# Patient Record
Sex: FEMALE | Race: WHITE | URBAN - METROPOLITAN AREA
[De-identification: names, ages, dates, MRNs, and addresses within clinical notes are randomized per-mention and may not be internally consistent; named-entity substitution may affect disease eponyms.]

---

## 2022-03-08 ENCOUNTER — APPOINTMENT (RX ONLY)
Dept: URBAN - METROPOLITAN AREA CLINIC 377 | Facility: CLINIC | Age: 62
Setting detail: DERMATOLOGY
End: 2022-03-08

## 2022-03-08 DIAGNOSIS — D22 MELANOCYTIC NEVI: ICD-10-CM

## 2022-03-08 DIAGNOSIS — L82.1 OTHER SEBORRHEIC KERATOSIS: ICD-10-CM

## 2022-03-08 DIAGNOSIS — Z85.820 PERSONAL HISTORY OF MALIGNANT MELANOMA OF SKIN: ICD-10-CM

## 2022-03-08 DIAGNOSIS — L81.4 OTHER MELANIN HYPERPIGMENTATION: ICD-10-CM

## 2022-03-08 DIAGNOSIS — D18.0 HEMANGIOMA: ICD-10-CM

## 2022-03-08 DIAGNOSIS — Z71.89 OTHER SPECIFIED COUNSELING: ICD-10-CM

## 2022-03-08 DIAGNOSIS — Z12.83 ENCOUNTER FOR SCREENING FOR MALIGNANT NEOPLASM OF SKIN: ICD-10-CM

## 2022-03-08 DIAGNOSIS — L71.8 OTHER ROSACEA: ICD-10-CM | Status: INADEQUATELY CONTROLLED

## 2022-03-08 DIAGNOSIS — D485 NEOPLASM OF UNCERTAIN BEHAVIOR OF SKIN: ICD-10-CM

## 2022-03-08 PROBLEM — D18.01 HEMANGIOMA OF SKIN AND SUBCUTANEOUS TISSUE: Status: ACTIVE | Noted: 2022-03-08

## 2022-03-08 PROBLEM — D22.5 MELANOCYTIC NEVI OF TRUNK: Status: ACTIVE | Noted: 2022-03-08

## 2022-03-08 PROBLEM — D48.5 NEOPLASM OF UNCERTAIN BEHAVIOR OF SKIN: Status: ACTIVE | Noted: 2022-03-08

## 2022-03-08 PROCEDURE — 11102 TANGNTL BX SKIN SINGLE LES: CPT

## 2022-03-08 PROCEDURE — ? PRESCRIPTION

## 2022-03-08 PROCEDURE — ? COUNSELING

## 2022-03-08 PROCEDURE — ? PRESCRIPTION MEDICATION MANAGEMENT

## 2022-03-08 PROCEDURE — ? BIOPSY BY SHAVE METHOD

## 2022-03-08 PROCEDURE — ? FULL BODY SKIN EXAM

## 2022-03-08 PROCEDURE — 99204 OFFICE O/P NEW MOD 45 MIN: CPT | Mod: 25

## 2022-03-08 PROCEDURE — ? EDUCATIONAL RESOURCES PROVIDED

## 2022-03-08 RX ORDER — METRONIDAZOLE 7.5 MG/ML
LOTION TOPICAL BID
Qty: 59 | Refills: 5 | Status: ERX | COMMUNITY
Start: 2022-03-08

## 2022-03-08 RX ADMIN — METRONIDAZOLE: 7.5 LOTION TOPICAL at 00:00

## 2022-03-08 ASSESSMENT — LOCATION DETAILED DESCRIPTION DERM
LOCATION DETAILED: LEFT MEDIAL MALAR CHEEK
LOCATION DETAILED: RIGHT MID-UPPER BACK
LOCATION DETAILED: LEFT SUPERIOR MEDIAL UPPER BACK
LOCATION DETAILED: RIGHT SUPERIOR MEDIAL MIDBACK
LOCATION DETAILED: RIGHT MEDIAL MALAR CHEEK
LOCATION DETAILED: RIGHT MEDIAL UPPER BACK
LOCATION DETAILED: LEFT INFERIOR MEDIAL UPPER BACK
LOCATION DETAILED: NASAL DORSUM
LOCATION DETAILED: RIGHT POSTERIOR SHOULDER
LOCATION DETAILED: RIGHT SUPERIOR MEDIAL UPPER BACK
LOCATION DETAILED: LEFT SUPERIOR MEDIAL MIDBACK
LOCATION DETAILED: RIGHT SUPERIOR UPPER BACK

## 2022-03-08 ASSESSMENT — LOCATION SIMPLE DESCRIPTION DERM
LOCATION SIMPLE: RIGHT LOWER BACK
LOCATION SIMPLE: LEFT UPPER BACK
LOCATION SIMPLE: RIGHT SHOULDER
LOCATION SIMPLE: LEFT LOWER BACK
LOCATION SIMPLE: NOSE
LOCATION SIMPLE: RIGHT UPPER BACK
LOCATION SIMPLE: LEFT CHEEK
LOCATION SIMPLE: RIGHT CHEEK

## 2022-03-08 ASSESSMENT — LOCATION ZONE DERM
LOCATION ZONE: FACE
LOCATION ZONE: TRUNK
LOCATION ZONE: ARM
LOCATION ZONE: NOSE

## 2022-03-08 ASSESSMENT — SEVERITY ASSESSMENT OVERALL AMONG ALL PATIENTS
IN YOUR EXPERIENCE, AMONG ALL PATIENTS YOU HAVE SEEN WITH THIS CONDITION, HOW SEVERE IS THIS PATIENT'S CONDITION?: MILD TO MODERATE

## 2022-03-08 NOTE — PROCEDURE: MIPS QUALITY
Quality 431: Preventive Care And Screening: Unhealthy Alcohol Use - Screening: Patient not identified as an unhealthy alcohol user when screened for unhealthy alcohol use using a systematic screening method
Quality 130: Documentation Of Current Medications In The Medical Record: Current Medications Documented
Quality 226: Preventive Care And Screening: Tobacco Use: Screening And Cessation Intervention: Patient screened for tobacco use and is an ex/non-smoker
Detail Level: Generalized
Quality 110: Preventive Care And Screening: Influenza Immunization: Influenza Immunization Administered during Influenza season
Quality 137: Melanoma: Continuity Of Care - Recall System: Patient information entered into a recall system that includes: target date for the next exam specified AND a process to follow up with patients regarding missed or unscheduled appointments

## 2022-03-08 NOTE — PROCEDURE: PRESCRIPTION MEDICATION MANAGEMENT
Render In Strict Bullet Format?: No
Plan: Spf emphasized
Initiate Treatment: Metro cream 0.75% apply bid
Detail Level: Zone

## 2022-03-08 NOTE — HPI: EVALUATION OF SKIN LESION(S)
What Type Of Note Output Would You Prefer (Optional)?: Bullet Format
Hpi Title: Evaluation of Skin Lesions
How Severe Are Your Spot(S)?: mild
Have Your Spot(S) Been Treated In The Past?: has not been treated
Family Member: Mom on the nose
Location: Right arm
Year Removed: 2013
Additional History: FBSE

## 2022-04-25 ENCOUNTER — APPOINTMENT (RX ONLY)
Dept: URBAN - METROPOLITAN AREA CLINIC 377 | Facility: CLINIC | Age: 62
Setting detail: DERMATOLOGY
End: 2022-04-25

## 2022-04-25 PROBLEM — C43.59 MALIGNANT MELANOMA OF OTHER PART OF TRUNK: Status: ACTIVE | Noted: 2022-04-25

## 2022-04-25 PROCEDURE — ? PRESCRIPTION

## 2022-04-25 PROCEDURE — 12034 INTMD RPR S/TR/EXT 7.6-12.5: CPT

## 2022-04-25 PROCEDURE — ? PRESCRIPTION MEDICATION MANAGEMENT

## 2022-04-25 PROCEDURE — ? EXCISION

## 2022-04-25 PROCEDURE — 11604 EXC TR-EXT MAL+MARG 3.1-4 CM: CPT

## 2022-04-25 RX ORDER — MUPIROCIN 20 MG/G
OINTMENT TOPICAL
Qty: 22 | Refills: 3 | Status: ERX | COMMUNITY
Start: 2022-04-25

## 2022-04-25 RX ADMIN — MUPIROCIN: 20 OINTMENT TOPICAL at 00:00

## 2022-05-09 ENCOUNTER — APPOINTMENT (RX ONLY)
Dept: URBAN - METROPOLITAN AREA CLINIC 377 | Facility: CLINIC | Age: 62
Setting detail: DERMATOLOGY
End: 2022-05-09

## 2022-05-09 DIAGNOSIS — Z85.820 PERSONAL HISTORY OF MALIGNANT MELANOMA OF SKIN: ICD-10-CM

## 2022-05-09 PROCEDURE — ? SUTURE REMOVAL (GLOBAL PERIOD)

## 2022-05-09 PROCEDURE — 99024 POSTOP FOLLOW-UP VISIT: CPT

## 2022-05-09 ASSESSMENT — LOCATION ZONE DERM: LOCATION ZONE: TRUNK

## 2022-05-09 ASSESSMENT — LOCATION SIMPLE DESCRIPTION DERM: LOCATION SIMPLE: RIGHT UPPER BACK

## 2022-05-09 ASSESSMENT — LOCATION DETAILED DESCRIPTION DERM: LOCATION DETAILED: RIGHT MEDIAL UPPER BACK

## 2022-05-09 NOTE — PROCEDURE: SUTURE REMOVAL (GLOBAL PERIOD)
Detail Level: Detailed
Add 03079 Cpt? (Important Note: In 2017 The Use Of 39203 Is Being Tracked By Cms To Determine Future Global Period Reimbursement For Global Periods): yes

## 2022-06-14 ENCOUNTER — APPOINTMENT (RX ONLY)
Dept: URBAN - METROPOLITAN AREA CLINIC 377 | Facility: CLINIC | Age: 62
Setting detail: DERMATOLOGY
End: 2022-06-14

## 2022-06-14 DIAGNOSIS — D18.0 HEMANGIOMA: ICD-10-CM

## 2022-06-14 DIAGNOSIS — L81.4 OTHER MELANIN HYPERPIGMENTATION: ICD-10-CM

## 2022-06-14 DIAGNOSIS — Z71.89 OTHER SPECIFIED COUNSELING: ICD-10-CM

## 2022-06-14 DIAGNOSIS — D22 MELANOCYTIC NEVI: ICD-10-CM

## 2022-06-14 DIAGNOSIS — L71.8 OTHER ROSACEA: ICD-10-CM | Status: IMPROVED

## 2022-06-14 DIAGNOSIS — Z85.820 PERSONAL HISTORY OF MALIGNANT MELANOMA OF SKIN: ICD-10-CM

## 2022-06-14 DIAGNOSIS — L82.1 OTHER SEBORRHEIC KERATOSIS: ICD-10-CM

## 2022-06-14 DIAGNOSIS — Z12.83 ENCOUNTER FOR SCREENING FOR MALIGNANT NEOPLASM OF SKIN: ICD-10-CM

## 2022-06-14 PROBLEM — D18.01 HEMANGIOMA OF SKIN AND SUBCUTANEOUS TISSUE: Status: ACTIVE | Noted: 2022-06-14

## 2022-06-14 PROBLEM — D22.5 MELANOCYTIC NEVI OF TRUNK: Status: ACTIVE | Noted: 2022-06-14

## 2022-06-14 PROCEDURE — ? EDUCATIONAL RESOURCES PROVIDED

## 2022-06-14 PROCEDURE — ? FULL BODY SKIN EXAM

## 2022-06-14 PROCEDURE — ? PRESCRIPTION MEDICATION MANAGEMENT

## 2022-06-14 PROCEDURE — 99213 OFFICE O/P EST LOW 20 MIN: CPT

## 2022-06-14 PROCEDURE — ? COUNSELING

## 2022-06-14 ASSESSMENT — LOCATION DETAILED DESCRIPTION DERM
LOCATION DETAILED: LEFT MEDIAL MALAR CHEEK
LOCATION DETAILED: NASAL DORSUM
LOCATION DETAILED: RIGHT MID-UPPER BACK
LOCATION DETAILED: RIGHT SUPERIOR UPPER BACK
LOCATION DETAILED: RIGHT MEDIAL UPPER BACK
LOCATION DETAILED: LEFT SUPERIOR MEDIAL MIDBACK
LOCATION DETAILED: LEFT SUPERIOR MEDIAL UPPER BACK
LOCATION DETAILED: LEFT INFERIOR MEDIAL UPPER BACK
LOCATION DETAILED: RIGHT SUPERIOR MEDIAL UPPER BACK
LOCATION DETAILED: RIGHT MEDIAL MALAR CHEEK

## 2022-06-14 ASSESSMENT — LOCATION SIMPLE DESCRIPTION DERM
LOCATION SIMPLE: RIGHT CHEEK
LOCATION SIMPLE: NOSE
LOCATION SIMPLE: LEFT CHEEK
LOCATION SIMPLE: LEFT LOWER BACK
LOCATION SIMPLE: RIGHT UPPER BACK
LOCATION SIMPLE: LEFT UPPER BACK

## 2022-06-14 ASSESSMENT — LOCATION ZONE DERM
LOCATION ZONE: NOSE
LOCATION ZONE: TRUNK
LOCATION ZONE: FACE

## 2022-06-14 NOTE — PROCEDURE: MIPS QUALITY
Quality 397: Melanoma: Reporting: Pathology report includes the pT Category, thickness, ulceration and mitotic rate, peripheral and deep margin status and presence or absence of microsatellitosis for invasive tumors
Quality 431: Preventive Care And Screening: Unhealthy Alcohol Use - Screening: Patient not identified as an unhealthy alcohol user when screened for unhealthy alcohol use using a systematic screening method
Quality 130: Documentation Of Current Medications In The Medical Record: Current Medications Documented
Quality 138: Melanoma: Coordination Of Care: A treatment plan was communicated to the physicians providing continuing care within one month of diagnosis outlining: diagnosis, tumor thickness and a plan for surgery or alternate care.
Quality 226: Preventive Care And Screening: Tobacco Use: Screening And Cessation Intervention: Patient screened for tobacco use and is an ex/non-smoker
Detail Level: Generalized
Quality 110: Preventive Care And Screening: Influenza Immunization: Influenza Immunization Administered during Influenza season
Quality 137: Melanoma: Continuity Of Care - Recall System: Patient information entered into a recall system that includes: target date for the next exam specified AND a process to follow up with patients regarding missed or unscheduled appointments

## 2022-06-14 NOTE — PROCEDURE: PRESCRIPTION MEDICATION MANAGEMENT
Render In Strict Bullet Format?: No
Continue Regimen: Metro cream 0.75% apply bid
Plan: Spf emphasized
Detail Level: Zone

## 2022-07-19 PROBLEM — E66.3 OVERWEIGHT: Status: ACTIVE | Noted: 2019-06-14

## 2022-08-17 ENCOUNTER — OFFICE (OUTPATIENT)
Dept: URBAN - METROPOLITAN AREA CLINIC 16 | Facility: CLINIC | Age: 62
End: 2022-08-17
Payer: COMMERCIAL

## 2022-08-17 VITALS
DIASTOLIC BLOOD PRESSURE: 72 MMHG | WEIGHT: 158 LBS | HEART RATE: 63 BPM | SYSTOLIC BLOOD PRESSURE: 132 MMHG | HEIGHT: 63 IN | OXYGEN SATURATION: 99 %

## 2022-08-17 DIAGNOSIS — D50.9 IRON DEFICIENCY ANEMIA, UNSPECIFIED: ICD-10-CM

## 2022-08-17 DIAGNOSIS — J02.9 ACUTE PHARYNGITIS, UNSPECIFIED: ICD-10-CM

## 2022-08-17 PROCEDURE — 99213 OFFICE O/P EST LOW 20 MIN: CPT | Performed by: NURSE PRACTITIONER

## 2022-09-14 ENCOUNTER — APPOINTMENT (RX ONLY)
Dept: URBAN - METROPOLITAN AREA CLINIC 377 | Facility: CLINIC | Age: 62
Setting detail: DERMATOLOGY
End: 2022-09-14

## 2022-09-14 DIAGNOSIS — Z85.820 PERSONAL HISTORY OF MALIGNANT MELANOMA OF SKIN: ICD-10-CM | Status: IMPROVED

## 2022-09-14 DIAGNOSIS — L71.8 OTHER ROSACEA: ICD-10-CM | Status: WELL CONTROLLED

## 2022-09-14 DIAGNOSIS — Z71.89 OTHER SPECIFIED COUNSELING: ICD-10-CM

## 2022-09-14 DIAGNOSIS — L82.1 OTHER SEBORRHEIC KERATOSIS: ICD-10-CM

## 2022-09-14 DIAGNOSIS — D18.0 HEMANGIOMA: ICD-10-CM

## 2022-09-14 DIAGNOSIS — Z12.83 ENCOUNTER FOR SCREENING FOR MALIGNANT NEOPLASM OF SKIN: ICD-10-CM

## 2022-09-14 DIAGNOSIS — D22 MELANOCYTIC NEVI: ICD-10-CM

## 2022-09-14 DIAGNOSIS — L81.4 OTHER MELANIN HYPERPIGMENTATION: ICD-10-CM

## 2022-09-14 PROBLEM — D18.01 HEMANGIOMA OF SKIN AND SUBCUTANEOUS TISSUE: Status: ACTIVE | Noted: 2022-09-14

## 2022-09-14 PROBLEM — D22.5 MELANOCYTIC NEVI OF TRUNK: Status: ACTIVE | Noted: 2022-09-14

## 2022-09-14 PROCEDURE — ? RECOMMENDATIONS

## 2022-09-14 PROCEDURE — 99213 OFFICE O/P EST LOW 20 MIN: CPT

## 2022-09-14 PROCEDURE — ? PRESCRIPTION MEDICATION MANAGEMENT

## 2022-09-14 PROCEDURE — ? EDUCATIONAL RESOURCES PROVIDED

## 2022-09-14 PROCEDURE — ? COUNSELING

## 2022-09-14 PROCEDURE — ? FULL BODY SKIN EXAM

## 2022-09-14 ASSESSMENT — LOCATION DETAILED DESCRIPTION DERM
LOCATION DETAILED: LEFT MEDIAL MALAR CHEEK
LOCATION DETAILED: LEFT SUPERIOR MEDIAL MIDBACK
LOCATION DETAILED: RIGHT MEDIAL UPPER BACK
LOCATION DETAILED: LEFT SUPERIOR MEDIAL UPPER BACK
LOCATION DETAILED: LEFT INFERIOR MEDIAL UPPER BACK
LOCATION DETAILED: RIGHT SUPERIOR MEDIAL UPPER BACK
LOCATION DETAILED: RIGHT MID-UPPER BACK
LOCATION DETAILED: NASAL DORSUM
LOCATION DETAILED: RIGHT MEDIAL MALAR CHEEK
LOCATION DETAILED: RIGHT SUPERIOR UPPER BACK

## 2022-09-14 ASSESSMENT — SEVERITY ASSESSMENT OVERALL AMONG ALL PATIENTS: IN YOUR EXPERIENCE, AMONG ALL PATIENTS YOU HAVE SEEN WITH THIS CONDITION, HOW SEVERE IS THIS PATIENT'S CONDITION?: MILD

## 2022-09-14 ASSESSMENT — LOCATION ZONE DERM
LOCATION ZONE: FACE
LOCATION ZONE: TRUNK
LOCATION ZONE: NOSE

## 2022-09-14 NOTE — PROCEDURE: MIPS QUALITY
Quality 137: Melanoma: Continuity Of Care - Recall System: Patient information entered into a recall system that includes: target date for the next exam specified AND a process to follow up with patients regarding missed or unscheduled appointments
Quality 397: Melanoma: Reporting: Pathology report includes the pT Category, thickness, ulceration and mitotic rate, peripheral and deep margin status and presence or absence of microsatellitosis for invasive tumors.
Quality 431: Preventive Care And Screening: Unhealthy Alcohol Use - Screening: Patient not identified as an unhealthy alcohol user when screened for unhealthy alcohol use using a systematic screening method
Detail Level: Generalized
Quality 130: Documentation Of Current Medications In The Medical Record: Current Medications Documented
Quality 138: Melanoma: Coordination Of Care: A treatment plan was communicated to the physicians providing continuing care within one month of diagnosis outlining: diagnosis, tumor thickness and a plan for surgery or alternate care.
Quality 110: Preventive Care And Screening: Influenza Immunization: Influenza Immunization Administered during Influenza season
Quality 226: Preventive Care And Screening: Tobacco Use: Screening And Cessation Intervention: Patient screened for tobacco use and is an ex/non-smoker

## 2022-09-14 NOTE — HPI: EVALUATION OF SKIN LESION(S)
What Type Of Note Output Would You Prefer (Optional)?: Bullet Format
Hpi Title: Evaluation of Skin Lesions
How Severe Are Your Spot(S)?: mild
Have Your Spot(S) Been Treated In The Past?: has not been treated
Additional History: Fbse,  h/onMM twice, 3mo f/u. 3 nodules on lungs for 2-3 years but now growing. Pulmonologist is investigating and following currently. Has not had eye exam for 2022. Denies weight loss or fatigue. Some coughing

## 2022-09-14 NOTE — PROCEDURE: RECOMMENDATIONS
Render Risk Assessment In Note?: no
Recommendation Preamble: The following recommendations were made during the visit:
Detail Level: Simple
Recommendations (Free Text): Yearly eye exam

## 2022-10-31 ENCOUNTER — OFFICE (OUTPATIENT)
Dept: URBAN - METROPOLITAN AREA CLINIC 16 | Facility: CLINIC | Age: 62
End: 2022-10-31
Payer: COMMERCIAL

## 2022-10-31 VITALS — WEIGHT: 158 LBS | SYSTOLIC BLOOD PRESSURE: 122 MMHG | HEIGHT: 63 IN | DIASTOLIC BLOOD PRESSURE: 78 MMHG

## 2022-10-31 DIAGNOSIS — J02.9 ACUTE PHARYNGITIS, UNSPECIFIED: ICD-10-CM

## 2022-10-31 DIAGNOSIS — R13.10 DYSPHAGIA, UNSPECIFIED: ICD-10-CM

## 2022-10-31 PROCEDURE — 99213 OFFICE O/P EST LOW 20 MIN: CPT | Performed by: NURSE PRACTITIONER

## 2022-12-20 ENCOUNTER — APPOINTMENT (RX ONLY)
Dept: URBAN - METROPOLITAN AREA CLINIC 377 | Facility: CLINIC | Age: 62
Setting detail: DERMATOLOGY
End: 2022-12-20

## 2022-12-20 DIAGNOSIS — D18.0 HEMANGIOMA: ICD-10-CM

## 2022-12-20 DIAGNOSIS — S0182XA OPEN WOUND OF OTHER AND MULTIPLE SITES OF FACE, COMPLICATED: ICD-10-CM | Status: WORSENING

## 2022-12-20 DIAGNOSIS — D22 MELANOCYTIC NEVI: ICD-10-CM

## 2022-12-20 DIAGNOSIS — Z12.83 ENCOUNTER FOR SCREENING FOR MALIGNANT NEOPLASM OF SKIN: ICD-10-CM

## 2022-12-20 DIAGNOSIS — L82.1 OTHER SEBORRHEIC KERATOSIS: ICD-10-CM

## 2022-12-20 DIAGNOSIS — L81.4 OTHER MELANIN HYPERPIGMENTATION: ICD-10-CM

## 2022-12-20 DIAGNOSIS — Z71.89 OTHER SPECIFIED COUNSELING: ICD-10-CM

## 2022-12-20 DIAGNOSIS — Z85.820 PERSONAL HISTORY OF MALIGNANT MELANOMA OF SKIN: ICD-10-CM

## 2022-12-20 PROBLEM — S31.109A UNSPECIFIED OPEN WOUND OF ABDOMINAL WALL, UNSPECIFIED QUADRANT WITHOUT PENETRATION INTO PERITONEAL CAVITY, INITIAL ENCOUNTER: Status: ACTIVE | Noted: 2022-12-20

## 2022-12-20 PROBLEM — D22.5 MELANOCYTIC NEVI OF TRUNK: Status: ACTIVE | Noted: 2022-12-20

## 2022-12-20 PROBLEM — S41.101A UNSPECIFIED OPEN WOUND OF RIGHT UPPER ARM, INITIAL ENCOUNTER: Status: ACTIVE | Noted: 2022-12-20

## 2022-12-20 PROBLEM — D18.01 HEMANGIOMA OF SKIN AND SUBCUTANEOUS TISSUE: Status: ACTIVE | Noted: 2022-12-20

## 2022-12-20 PROCEDURE — ? FULL BODY SKIN EXAM

## 2022-12-20 PROCEDURE — ? ADDITIONAL NOTES

## 2022-12-20 PROCEDURE — ? PHOTO-DOCUMENTATION

## 2022-12-20 PROCEDURE — ? OTHER

## 2022-12-20 PROCEDURE — ? COUNSELING

## 2022-12-20 PROCEDURE — 99213 OFFICE O/P EST LOW 20 MIN: CPT

## 2022-12-20 PROCEDURE — ? SUNSCREEN RECOMMENDATIONS

## 2022-12-20 PROCEDURE — ? RECOMMENDATIONS

## 2022-12-20 ASSESSMENT — LOCATION ZONE DERM
LOCATION ZONE: AXILLAE
LOCATION ZONE: ARM
LOCATION ZONE: TRUNK

## 2022-12-20 ASSESSMENT — LOCATION SIMPLE DESCRIPTION DERM
LOCATION SIMPLE: ABDOMEN
LOCATION SIMPLE: LEFT LOWER BACK
LOCATION SIMPLE: RIGHT UPPER BACK
LOCATION SIMPLE: RIGHT UPPER ARM
LOCATION SIMPLE: LEFT UPPER BACK
LOCATION SIMPLE: RIGHT AXILLA

## 2022-12-20 ASSESSMENT — LOCATION DETAILED DESCRIPTION DERM
LOCATION DETAILED: RIGHT SUPERIOR UPPER BACK
LOCATION DETAILED: RIGHT POSTERIOR AXILLA
LOCATION DETAILED: RIGHT MEDIAL UPPER BACK
LOCATION DETAILED: LEFT SUPERIOR MEDIAL MIDBACK
LOCATION DETAILED: RIGHT LATERAL PROXIMAL UPPER ARM
LOCATION DETAILED: LEFT INFERIOR MEDIAL UPPER BACK
LOCATION DETAILED: RIGHT SUPERIOR FLANK
LOCATION DETAILED: RIGHT MID-UPPER BACK
LOCATION DETAILED: RIGHT SUPERIOR MEDIAL UPPER BACK
LOCATION DETAILED: LEFT SUPERIOR MEDIAL UPPER BACK

## 2022-12-20 NOTE — PROCEDURE: ADDITIONAL NOTES
Additional Notes: Pt. Had surgery 11/07/22 with , [facility] (Lobectomy due to lung CA) She states the incision has not healed is very painful with drainage from area. She was tearful during visit due to pain and states she was sent home from work last night because of the pain and drainage. She tells me she has called the surgeons office approx 5 times over the past 2 weeks to try to get in for follow up appt for this but can’t get in until tomorrow. She also stated an employee told her to just keep the area clean but she didn’t need to cover the area. My MA called the surgeons office and spoke with Mercedes who stated they could not see her any sooner. I then spoke with Mercedes asking if the surgeon was aware of the patient’s condition and she stated there weren’t any notes about the pt calling other than yesterday. I informed her what the patient had told us and requested the patient be seen today. Mercedes stated they could not see her and I explained how much pain she was in and my concern about having an open incision and her current state of immunosuppression. I was not able to talk with a provider.\\nMichasher then called back about 5 minutes later wanting our information and I spoke with her again. I explained to her that the surgeon needs to be made aware about the pts concerns and issues and that I suggested to the patient to walk in to the office or go to the nearest ER if necessary. I explained to the patient I am unable to prescribe any pain meds and she understood. Mercedes states she would talk to the PA and have him reach out to the patient today.  Otherwise, she Will see  tomorrow 11/21/22 regarding wound Additional Notes: Pt. Had surgery 11/07/22 with , [facility] (Lobectomy due to lung CA) She states the incision has not healed is very painful with drainage from area. She was tearful during visit due to pain and states she was sent home from work last night because of the pain and drainage. She tells me she has called the surgeons office approx 5 times over the past 2 weeks to try to get in for follow up appt for this but can’t get in until tomorrow. She also stated an employee told her to just keep the area clean but she didn’t need to cover the area. My MA called the surgeons office and spoke with Mercedes who stated they could not see her any sooner. I then spoke with Mercedes asking if the surgeon was aware of the patient’s condition and she stated there weren’t any notes about the pt calling other than yesterday. I informed her what the patient had told us and requested the patient be seen today. Mercedes stated they could not see her and I explained how much pain she was in and my concern about having an open incision and her current state of immunosuppression. I was not able to talk with a provider.\\nMichasher then called back about 5 minutes later wanting our information and I spoke with her again. I explained to her that the surgeon needs to be made aware about the pts concerns and issues and that I suggested to the patient to walk in to the office or go to the nearest ER if necessary. I explained to the patient I am unable to prescribe any pain meds and she understood. Mercedes states she would talk to the PA and have him reach out to the patient today.  Otherwise, she Will see  tomorrow 11/21/22 regarding wound

## 2022-12-20 NOTE — PROCEDURE: OTHER
Render Risk Assessment In Note?: no
Detail Level: Generalized
Other (Free Text): Area cleaned at visit and telfa pad with pressure dressing applied
Note Text (......Xxx Chief Complaint.): This diagnosis correlates with the

## 2022-12-20 NOTE — PROCEDURE: MIPS QUALITY
Quality 431: Preventive Care And Screening: Unhealthy Alcohol Use - Screening: Patient not identified as an unhealthy alcohol user when screened for unhealthy alcohol use using a systematic screening method
Quality 110: Preventive Care And Screening: Influenza Immunization: Influenza Immunization Administered during Influenza season
Quality 138: Melanoma: Coordination Of Care: A treatment plan was communicated to the physicians providing continuing care within one month of diagnosis outlining: diagnosis, tumor thickness and a plan for surgery or alternate care.
Quality 226: Preventive Care And Screening: Tobacco Use: Screening And Cessation Intervention: Patient screened for tobacco use and is an ex/non-smoker
Quality 111:Pneumonia Vaccination Status For Older Adults: Pneumococcal vaccine (PPSV23) administered on or after patient’s 60th birthday and before the end of the measurement period
Quality 397: Melanoma: Reporting: Pathology report includes the pT Category, thickness, ulceration and mitotic rate, peripheral and deep margin status and presence or absence of microsatellitosis for invasive tumors.
Quality 130: Documentation Of Current Medications In The Medical Record: Current Medications Documented
Quality 137: Melanoma: Continuity Of Care - Recall System: Patient information entered into a recall system that includes: target date for the next exam specified AND a process to follow up with patients regarding missed or unscheduled appointments
Detail Level: Generalized

## 2023-03-14 ENCOUNTER — APPOINTMENT (RX ONLY)
Dept: URBAN - METROPOLITAN AREA CLINIC 377 | Facility: CLINIC | Age: 63
Setting detail: DERMATOLOGY
End: 2023-03-14

## 2023-03-14 DIAGNOSIS — D485 NEOPLASM OF UNCERTAIN BEHAVIOR OF SKIN: ICD-10-CM

## 2023-03-14 DIAGNOSIS — Z71.89 OTHER SPECIFIED COUNSELING: ICD-10-CM

## 2023-03-14 DIAGNOSIS — Z12.83 ENCOUNTER FOR SCREENING FOR MALIGNANT NEOPLASM OF SKIN: ICD-10-CM

## 2023-03-14 DIAGNOSIS — L82.1 OTHER SEBORRHEIC KERATOSIS: ICD-10-CM

## 2023-03-14 DIAGNOSIS — D84.9 IMMUNODEFICIENCY, UNSPECIFIED: ICD-10-CM

## 2023-03-14 DIAGNOSIS — D18.0 HEMANGIOMA: ICD-10-CM

## 2023-03-14 DIAGNOSIS — Z85.820 PERSONAL HISTORY OF MALIGNANT MELANOMA OF SKIN: ICD-10-CM

## 2023-03-14 DIAGNOSIS — L81.4 OTHER MELANIN HYPERPIGMENTATION: ICD-10-CM

## 2023-03-14 DIAGNOSIS — D22 MELANOCYTIC NEVI: ICD-10-CM

## 2023-03-14 PROBLEM — D18.01 HEMANGIOMA OF SKIN AND SUBCUTANEOUS TISSUE: Status: ACTIVE | Noted: 2023-03-14

## 2023-03-14 PROBLEM — D22.5 MELANOCYTIC NEVI OF TRUNK: Status: ACTIVE | Noted: 2023-03-14

## 2023-03-14 PROBLEM — D48.5 NEOPLASM OF UNCERTAIN BEHAVIOR OF SKIN: Status: ACTIVE | Noted: 2023-03-14

## 2023-03-14 PROCEDURE — ? BIOPSY BY SHAVE METHOD

## 2023-03-14 PROCEDURE — 11102 TANGNTL BX SKIN SINGLE LES: CPT

## 2023-03-14 PROCEDURE — ? SUNSCREEN RECOMMENDATIONS

## 2023-03-14 PROCEDURE — ? COUNSELING

## 2023-03-14 PROCEDURE — 99213 OFFICE O/P EST LOW 20 MIN: CPT | Mod: 25

## 2023-03-14 PROCEDURE — ? RECOMMENDATIONS

## 2023-03-14 ASSESSMENT — LOCATION DETAILED DESCRIPTION DERM
LOCATION DETAILED: RIGHT LATERAL PROXIMAL UPPER ARM
LOCATION DETAILED: RIGHT SUPERIOR MEDIAL MIDBACK
LOCATION DETAILED: LEFT SUPERIOR MEDIAL MIDBACK
LOCATION DETAILED: RIGHT MEDIAL UPPER BACK
LOCATION DETAILED: LEFT MID-UPPER BACK
LOCATION DETAILED: LEFT INFERIOR UPPER BACK

## 2023-03-14 ASSESSMENT — LOCATION SIMPLE DESCRIPTION DERM
LOCATION SIMPLE: RIGHT UPPER BACK
LOCATION SIMPLE: RIGHT UPPER ARM
LOCATION SIMPLE: RIGHT LOWER BACK
LOCATION SIMPLE: LEFT UPPER BACK
LOCATION SIMPLE: LEFT LOWER BACK

## 2023-03-14 ASSESSMENT — LOCATION ZONE DERM
LOCATION ZONE: TRUNK
LOCATION ZONE: ARM

## 2023-03-14 NOTE — HPI: EVALUATION OF SKIN LESION(S)
What Type Of Note Output Would You Prefer (Optional)?: Bullet Format
Hpi Title: Evaluation of Skin Lesions
How Severe Are Your Spot(S)?: mild
Have Your Spot(S) Been Treated In The Past?: has not been treated
Family Member: Uncle and mother
Additional History: Pt present for fbse with no concerns

## 2023-03-14 NOTE — PROCEDURE: BIOPSY BY SHAVE METHOD
Detail Level: Detailed
Depth Of Biopsy: dermis
Was A Bandage Applied: Yes
Size Of Lesion In Cm: 0
Biopsy Type: H and E
Biopsy Method: Dermablade
Anesthesia Type: 2% lidocaine with epinephrine
Anesthesia Volume In Cc (Will Not Render If 0): 0.5
Hemostasis: Aluminum Chloride
Wound Care: Petrolatum
Dressing: bandage
Destruction After The Procedure: No
Type Of Destruction Used: Curettage
Curettage Text: The wound bed was treated with curettage after the biopsy was performed.
Cryotherapy Text: The wound bed was treated with cryotherapy after the biopsy was performed.
Electrodesiccation Text: The wound bed was treated with electrodesiccation after the biopsy was performed.
Electrodesiccation And Curettage Text: The wound bed was treated with electrodesiccation and curettage after the biopsy was performed.
Silver Nitrate Text: The wound bed was treated with silver nitrate after the biopsy was performed.
Lab: 6
Path Notes (To The Dermatopathologist): Kassi of [facility]
Consent: Written consent was obtained and risks were reviewed including but not limited to scarring, infection, bleeding, scabbing, incomplete removal, nerve damage and allergy to anesthesia.
Post-Care Instructions: I reviewed with the patient in detail post-care instructions. Patient is to keep the biopsy site dry overnight, and then apply bacitracin twice daily until healed. Patient may apply hydrogen peroxide soaks to remove any crusting.
Notification Instructions: Patient will be notified of biopsy results. However, patient instructed to call the office if not contacted within 2 weeks.
Billing Type: Third-Party Bill
Information: Selecting Yes will display possible errors in your note based on the variables you have selected. This validation is only offered as a suggestion for you. PLEASE NOTE THAT THE VALIDATION TEXT WILL BE REMOVED WHEN YOU FINALIZE YOUR NOTE. IF YOU WANT TO FAX A PRELIMINARY NOTE YOU WILL NEED TO TOGGLE THIS TO 'NO' IF YOU DO NOT WANT IT IN YOUR FAXED NOTE.

## 2023-05-10 ENCOUNTER — OFFICE (OUTPATIENT)
Dept: URBAN - METROPOLITAN AREA CLINIC 16 | Facility: CLINIC | Age: 63
End: 2023-05-10

## 2023-05-10 VITALS
HEIGHT: 63 IN | SYSTOLIC BLOOD PRESSURE: 136 MMHG | DIASTOLIC BLOOD PRESSURE: 58 MMHG | WEIGHT: 158 LBS | HEART RATE: 78 BPM

## 2023-05-10 DIAGNOSIS — K21.9 GASTRO-ESOPHAGEAL REFLUX DISEASE WITHOUT ESOPHAGITIS: ICD-10-CM

## 2023-05-10 DIAGNOSIS — R13.10 DYSPHAGIA, UNSPECIFIED: ICD-10-CM

## 2023-05-10 DIAGNOSIS — B18.2 CHRONIC VIRAL HEPATITIS C: ICD-10-CM

## 2023-05-10 PROCEDURE — 99214 OFFICE O/P EST MOD 30 MIN: CPT

## 2023-05-11 LAB
COMPREHENSIVE METABOLIC PANEL: A/G RATIO: 1.2 RATIO
COMPREHENSIVE METABOLIC PANEL: ALBUMIN: 4.1 G/DL
COMPREHENSIVE METABOLIC PANEL: ALK PHOSPHATASE: 120 U/L
COMPREHENSIVE METABOLIC PANEL: ALT: 24 U/L
COMPREHENSIVE METABOLIC PANEL: AST: 28 U/L
COMPREHENSIVE METABOLIC PANEL: BILIRUBIN,TOTAL: 0.4 MG/DL
COMPREHENSIVE METABOLIC PANEL: BLOOD UREA NITROGEN: 19 MG/DL
COMPREHENSIVE METABOLIC PANEL: BUN/CREAT RATIO: 16
COMPREHENSIVE METABOLIC PANEL: CALCIUM: 10 MG/DL
COMPREHENSIVE METABOLIC PANEL: CHLORIDE: 105 MEQ/L
COMPREHENSIVE METABOLIC PANEL: CO2: 26 MEQ/L
COMPREHENSIVE METABOLIC PANEL: CREATININE: 1.2 MG/DL
COMPREHENSIVE METABOLIC PANEL: FASTING STATUS: (no result)
COMPREHENSIVE METABOLIC PANEL: GLOBULIN: 3.3 G/DL
COMPREHENSIVE METABOLIC PANEL: GLOMERULAR FILTRATION RATE (GFR): 51 MLS/MIN/1.73M2 — LOW
COMPREHENSIVE METABOLIC PANEL: GLUCOSE,RANDOM: 85 MG/DL
COMPREHENSIVE METABOLIC PANEL: POTASSIUM: 4 MEQ/L
COMPREHENSIVE METABOLIC PANEL: SODIUM: 145 MEQ/L
COMPREHENSIVE METABOLIC PANEL: TOTAL PROTEIN: 7.4 G/DL

## 2023-06-19 ENCOUNTER — OFFICE (OUTPATIENT)
Dept: URBAN - METROPOLITAN AREA PATHOLOGY 1 | Facility: PATHOLOGY | Age: 63
End: 2023-06-19
Payer: COMMERCIAL

## 2023-06-19 ENCOUNTER — AMBULATORY SURGICAL CENTER (OUTPATIENT)
Dept: URBAN - METROPOLITAN AREA SURGERY 7 | Facility: SURGERY | Age: 63
End: 2023-06-19
Payer: COMMERCIAL

## 2023-06-19 ENCOUNTER — AMBULATORY SURGICAL CENTER (OUTPATIENT)
Dept: URBAN - METROPOLITAN AREA SURGERY 7 | Facility: SURGERY | Age: 63
End: 2023-06-19

## 2023-06-19 VITALS
WEIGHT: 150 LBS | HEART RATE: 113 BPM | HEART RATE: 84 BPM | RESPIRATION RATE: 6 BRPM | SYSTOLIC BLOOD PRESSURE: 188 MMHG | OXYGEN SATURATION: 99 % | SYSTOLIC BLOOD PRESSURE: 150 MMHG | RESPIRATION RATE: 6 BRPM | DIASTOLIC BLOOD PRESSURE: 81 MMHG | DIASTOLIC BLOOD PRESSURE: 73 MMHG | HEART RATE: 75 BPM | HEART RATE: 68 BPM | OXYGEN SATURATION: 94 % | RESPIRATION RATE: 8 BRPM | OXYGEN SATURATION: 97 % | SYSTOLIC BLOOD PRESSURE: 170 MMHG | OXYGEN SATURATION: 98 % | HEART RATE: 82 BPM | SYSTOLIC BLOOD PRESSURE: 146 MMHG | DIASTOLIC BLOOD PRESSURE: 101 MMHG | DIASTOLIC BLOOD PRESSURE: 68 MMHG | DIASTOLIC BLOOD PRESSURE: 76 MMHG | SYSTOLIC BLOOD PRESSURE: 150 MMHG | SYSTOLIC BLOOD PRESSURE: 188 MMHG | OXYGEN SATURATION: 98 % | DIASTOLIC BLOOD PRESSURE: 81 MMHG | DIASTOLIC BLOOD PRESSURE: 101 MMHG | RESPIRATION RATE: 26 BRPM | HEART RATE: 113 BPM | SYSTOLIC BLOOD PRESSURE: 143 MMHG | DIASTOLIC BLOOD PRESSURE: 79 MMHG | HEART RATE: 74 BPM | HEART RATE: 80 BPM | OXYGEN SATURATION: 94 % | WEIGHT: 150 LBS | OXYGEN SATURATION: 97 % | DIASTOLIC BLOOD PRESSURE: 70 MMHG | HEART RATE: 74 BPM | HEIGHT: 63 IN | TEMPERATURE: 97.4 F | DIASTOLIC BLOOD PRESSURE: 73 MMHG | HEART RATE: 82 BPM | DIASTOLIC BLOOD PRESSURE: 76 MMHG | RESPIRATION RATE: 26 BRPM | DIASTOLIC BLOOD PRESSURE: 70 MMHG | RESPIRATION RATE: 8 BRPM | HEART RATE: 75 BPM | OXYGEN SATURATION: 99 % | TEMPERATURE: 97.4 F | HEART RATE: 80 BPM | SYSTOLIC BLOOD PRESSURE: 162 MMHG | HEIGHT: 63 IN | RESPIRATION RATE: 16 BRPM | SYSTOLIC BLOOD PRESSURE: 146 MMHG | SYSTOLIC BLOOD PRESSURE: 143 MMHG | RESPIRATION RATE: 16 BRPM | DIASTOLIC BLOOD PRESSURE: 79 MMHG | SYSTOLIC BLOOD PRESSURE: 170 MMHG | DIASTOLIC BLOOD PRESSURE: 68 MMHG | HEART RATE: 68 BPM | SYSTOLIC BLOOD PRESSURE: 162 MMHG | HEART RATE: 84 BPM

## 2023-06-19 DIAGNOSIS — K21.9 GASTRO-ESOPHAGEAL REFLUX DISEASE WITHOUT ESOPHAGITIS: ICD-10-CM

## 2023-06-19 DIAGNOSIS — K22.89 OTHER SPECIFIED DISEASE OF ESOPHAGUS: ICD-10-CM

## 2023-06-19 DIAGNOSIS — K20.90 ESOPHAGITIS, UNSPECIFIED WITHOUT BLEEDING: ICD-10-CM

## 2023-06-19 DIAGNOSIS — K31.7 POLYP OF STOMACH AND DUODENUM: ICD-10-CM

## 2023-06-19 DIAGNOSIS — K20.80 OTHER ESOPHAGITIS WITHOUT BLEEDING: ICD-10-CM

## 2023-06-19 DIAGNOSIS — R13.10 DYSPHAGIA, UNSPECIFIED: ICD-10-CM

## 2023-06-19 DIAGNOSIS — K21.00 GASTRO-ESOPHAGEAL REFLUX DISEASE WITH ESOPHAGITIS, WITHOUT B: ICD-10-CM

## 2023-06-19 DIAGNOSIS — K44.9 DIAPHRAGMATIC HERNIA WITHOUT OBSTRUCTION OR GANGRENE: ICD-10-CM

## 2023-06-19 PROCEDURE — 43248 EGD GUIDE WIRE INSERTION: CPT | Performed by: INTERNAL MEDICINE

## 2023-06-19 PROCEDURE — 43450 DILATE ESOPHAGUS 1/MULT PASS: CPT | Performed by: INTERNAL MEDICINE

## 2023-06-19 PROCEDURE — 88342 IMHCHEM/IMCYTCHM 1ST ANTB: CPT | Mod: TC | Performed by: PATHOLOGY

## 2023-06-19 PROCEDURE — 88312 SPECIAL STAINS GROUP 1: CPT | Mod: TC | Performed by: PATHOLOGY

## 2023-06-19 PROCEDURE — 43239 EGD BIOPSY SINGLE/MULTIPLE: CPT | Mod: 59 | Performed by: INTERNAL MEDICINE

## 2023-06-19 PROCEDURE — 88305 TISSUE EXAM BY PATHOLOGIST: CPT | Mod: TC | Performed by: PATHOLOGY

## 2023-06-19 PROCEDURE — 43239 EGD BIOPSY SINGLE/MULTIPLE: CPT | Performed by: INTERNAL MEDICINE

## 2023-06-22 ENCOUNTER — APPOINTMENT (RX ONLY)
Dept: URBAN - METROPOLITAN AREA CLINIC 377 | Facility: CLINIC | Age: 63
Setting detail: DERMATOLOGY
End: 2023-06-22

## 2023-06-22 DIAGNOSIS — D22 MELANOCYTIC NEVI: ICD-10-CM

## 2023-06-22 DIAGNOSIS — D18.0 HEMANGIOMA: ICD-10-CM

## 2023-06-22 DIAGNOSIS — Z12.83 ENCOUNTER FOR SCREENING FOR MALIGNANT NEOPLASM OF SKIN: ICD-10-CM

## 2023-06-22 DIAGNOSIS — L82.1 OTHER SEBORRHEIC KERATOSIS: ICD-10-CM

## 2023-06-22 DIAGNOSIS — Z71.89 OTHER SPECIFIED COUNSELING: ICD-10-CM

## 2023-06-22 DIAGNOSIS — L81.4 OTHER MELANIN HYPERPIGMENTATION: ICD-10-CM

## 2023-06-22 DIAGNOSIS — Z85.820 PERSONAL HISTORY OF MALIGNANT MELANOMA OF SKIN: ICD-10-CM

## 2023-06-22 DIAGNOSIS — D485 NEOPLASM OF UNCERTAIN BEHAVIOR OF SKIN: ICD-10-CM | Status: INADEQUATELY CONTROLLED

## 2023-06-22 PROBLEM — D22.5 MELANOCYTIC NEVI OF TRUNK: Status: ACTIVE | Noted: 2023-06-22

## 2023-06-22 PROBLEM — D18.01 HEMANGIOMA OF SKIN AND SUBCUTANEOUS TISSUE: Status: ACTIVE | Noted: 2023-06-22

## 2023-06-22 PROBLEM — D48.5 NEOPLASM OF UNCERTAIN BEHAVIOR OF SKIN: Status: ACTIVE | Noted: 2023-06-22

## 2023-06-22 PROCEDURE — ? RECOMMENDATIONS

## 2023-06-22 PROCEDURE — ? BIOPSY BY SHAVE METHOD

## 2023-06-22 PROCEDURE — 99213 OFFICE O/P EST LOW 20 MIN: CPT | Mod: 25

## 2023-06-22 PROCEDURE — ? COUNSELING

## 2023-06-22 PROCEDURE — 11102 TANGNTL BX SKIN SINGLE LES: CPT

## 2023-06-22 PROCEDURE — ? PHOTO-DOCUMENTATION

## 2023-06-22 PROCEDURE — ? SUNSCREEN RECOMMENDATIONS

## 2023-06-22 ASSESSMENT — LOCATION DETAILED DESCRIPTION DERM
LOCATION DETAILED: LEFT LATERAL UPPER BACK
LOCATION DETAILED: LEFT MID-UPPER BACK
LOCATION DETAILED: LEFT SUPERIOR MEDIAL MIDBACK
LOCATION DETAILED: RIGHT LATERAL PROXIMAL UPPER ARM
LOCATION DETAILED: LEFT INFERIOR UPPER BACK
LOCATION DETAILED: RIGHT MEDIAL UPPER BACK

## 2023-06-22 ASSESSMENT — LOCATION ZONE DERM
LOCATION ZONE: ARM
LOCATION ZONE: TRUNK

## 2023-06-22 ASSESSMENT — LOCATION SIMPLE DESCRIPTION DERM
LOCATION SIMPLE: RIGHT UPPER BACK
LOCATION SIMPLE: LEFT LOWER BACK
LOCATION SIMPLE: RIGHT UPPER ARM
LOCATION SIMPLE: LEFT UPPER BACK

## 2023-06-22 NOTE — HPI: EVALUATION OF SKIN LESION(S)
What Type Of Note Output Would You Prefer (Optional)?: Bullet Format
Hpi Title: Evaluation of Skin Lesions
How Severe Are Your Spot(S)?: mild
Have Your Spot(S) Been Treated In The Past?: has not been treated
Family Member: Mother and Uncles-Maternal
Additional History: FBSE\\nHas history of lung cancer

## 2023-06-22 NOTE — PROCEDURE: MIPS QUALITY
Quality 226: Preventive Care And Screening: Tobacco Use: Screening And Cessation Intervention: Patient screened for tobacco use and is an ex/non-smoker
Quality 111:Pneumonia Vaccination Status For Older Adults: Patient received any pneumococcal conjugate or polysaccharide vaccine on or after their 60th birthday and before the end of the measurement period
Quality 130: Documentation Of Current Medications In The Medical Record: Current Medications Documented
Detail Level: Generalized
Quality 431: Preventive Care And Screening: Unhealthy Alcohol Use - Screening: Patient identified as an unhealthy alcohol user when screened for unhealthy alcohol use using a systematic screening method and received brief counseling
Quality 137: Melanoma: Continuity Of Care - Recall System: Patient information entered into a recall system that includes: target date for the next exam specified AND a process to follow up with patients regarding missed or unscheduled appointments
Quality 110: Preventive Care And Screening: Influenza Immunization: Influenza Immunization previously received during influenza season

## 2023-06-23 LAB
PDF: PDF REPORT: (no result)
PDF: PDF REPORT: (no result)

## 2023-09-28 ENCOUNTER — APPOINTMENT (RX ONLY)
Dept: URBAN - METROPOLITAN AREA CLINIC 377 | Facility: CLINIC | Age: 63
Setting detail: DERMATOLOGY
End: 2023-09-28

## 2023-09-28 DIAGNOSIS — D22 MELANOCYTIC NEVI: ICD-10-CM

## 2023-09-28 DIAGNOSIS — L71.8 OTHER ROSACEA: ICD-10-CM

## 2023-09-28 DIAGNOSIS — Z85.820 PERSONAL HISTORY OF MALIGNANT MELANOMA OF SKIN: ICD-10-CM

## 2023-09-28 DIAGNOSIS — D18.0 HEMANGIOMA: ICD-10-CM

## 2023-09-28 DIAGNOSIS — Z71.89 OTHER SPECIFIED COUNSELING: ICD-10-CM

## 2023-09-28 DIAGNOSIS — Z12.83 ENCOUNTER FOR SCREENING FOR MALIGNANT NEOPLASM OF SKIN: ICD-10-CM

## 2023-09-28 DIAGNOSIS — L81.4 OTHER MELANIN HYPERPIGMENTATION: ICD-10-CM

## 2023-09-28 DIAGNOSIS — L82.1 OTHER SEBORRHEIC KERATOSIS: ICD-10-CM

## 2023-09-28 PROBLEM — D18.01 HEMANGIOMA OF SKIN AND SUBCUTANEOUS TISSUE: Status: ACTIVE | Noted: 2023-09-28

## 2023-09-28 PROBLEM — D22.5 MELANOCYTIC NEVI OF TRUNK: Status: ACTIVE | Noted: 2023-09-28

## 2023-09-28 PROCEDURE — ? PRESCRIPTION

## 2023-09-28 PROCEDURE — ? PRESCRIPTION MEDICATION MANAGEMENT

## 2023-09-28 PROCEDURE — ? COUNSELING

## 2023-09-28 PROCEDURE — 99214 OFFICE O/P EST MOD 30 MIN: CPT

## 2023-09-28 PROCEDURE — ? RECOMMENDATIONS

## 2023-09-28 PROCEDURE — ? SUNSCREEN RECOMMENDATIONS

## 2023-09-28 RX ORDER — METRONIDAZOLE 7.5 MG/ML
LOTION TOPICAL BID
Qty: 59 | Refills: 11 | Status: ERX | COMMUNITY
Start: 2023-09-28

## 2023-09-28 RX ADMIN — METRONIDAZOLE: 7.5 LOTION TOPICAL at 00:00

## 2023-09-28 ASSESSMENT — LOCATION SIMPLE DESCRIPTION DERM
LOCATION SIMPLE: RIGHT UPPER BACK
LOCATION SIMPLE: LEFT CHEEK
LOCATION SIMPLE: RIGHT CHEEK
LOCATION SIMPLE: NOSE
LOCATION SIMPLE: LEFT UPPER BACK
LOCATION SIMPLE: RIGHT UPPER ARM
LOCATION SIMPLE: LEFT LOWER BACK

## 2023-09-28 ASSESSMENT — LOCATION DETAILED DESCRIPTION DERM
LOCATION DETAILED: RIGHT MEDIAL UPPER BACK
LOCATION DETAILED: LEFT SUPERIOR MEDIAL MIDBACK
LOCATION DETAILED: RIGHT LATERAL PROXIMAL UPPER ARM
LOCATION DETAILED: LEFT INFERIOR UPPER BACK
LOCATION DETAILED: LEFT MID-UPPER BACK
LOCATION DETAILED: LEFT MEDIAL MALAR CHEEK
LOCATION DETAILED: RIGHT MEDIAL MALAR CHEEK
LOCATION DETAILED: NASAL DORSUM

## 2023-09-28 ASSESSMENT — LOCATION ZONE DERM
LOCATION ZONE: ARM
LOCATION ZONE: FACE
LOCATION ZONE: NOSE
LOCATION ZONE: TRUNK

## 2023-09-28 NOTE — HPI: EVALUATION OF SKIN LESION(S)
What Type Of Note Output Would You Prefer (Optional)?: Bullet Format
Hpi Title: Evaluation of Skin Lesions
How Severe Are Your Spot(S)?: mild
Have Your Spot(S) Been Treated In The Past?: has not been treated
Additional History: MM FBSE

## 2023-09-28 NOTE — PROCEDURE: MIPS QUALITY
Quality 226: Preventive Care And Screening: Tobacco Use: Screening And Cessation Intervention: Patient screened for tobacco use and is an ex/non-smoker
Quality 137: Melanoma: Continuity Of Care - Recall System: Patient information entered into a recall system that includes: target date for the next exam specified AND a process to follow up with patients regarding missed or unscheduled appointments
Quality 111:Pneumonia Vaccination Status For Older Adults: Patient received any pneumococcal conjugate or polysaccharide vaccine on or after their 60th birthday and before the end of the measurement period
Detail Level: Generalized
Quality 130: Documentation Of Current Medications In The Medical Record: Current Medications Documented
Quality 110: Preventive Care And Screening: Influenza Immunization: Influenza Immunization Administered during Influenza season
Quality 431: Preventive Care And Screening: Unhealthy Alcohol Use - Screening: Patient identified as an unhealthy alcohol user when screened for unhealthy alcohol use using a systematic screening method and received brief counseling

## 2023-09-28 NOTE — PROCEDURE: PRESCRIPTION MEDICATION MANAGEMENT
Render In Strict Bullet Format?: No
Continue Regimen: metronidazole 0.75 % lotion BID
Plan: Spf emphasized
Detail Level: Zone

## 2023-10-03 NOTE — PROCEDURE: BIOPSY BY SHAVE METHOD
normal S1, S2 heard
Detail Level: Detailed
Depth Of Biopsy: dermis
Was A Bandage Applied: Yes
Size Of Lesion In Cm: 0
Biopsy Type: H and E
Biopsy Method: double edge Personna blade
Anesthesia Type: 2% lidocaine with epinephrine
Anesthesia Volume In Cc (Will Not Render If 0): 0.5
Hemostasis: Aluminum Chloride
Wound Care: Aquaphor
Dressing: bandage
Destruction After The Procedure: No
Type Of Destruction Used: Curettage
Curettage Text: The wound bed was treated with curettage after the biopsy was performed.
Cryotherapy Text: The wound bed was treated with cryotherapy after the biopsy was performed.
Electrodesiccation Text: The wound bed was treated with electrodesiccation after the biopsy was performed.
Electrodesiccation And Curettage Text: The wound bed was treated with electrodesiccation and curettage after the biopsy was performed.
Silver Nitrate Text: The wound bed was treated with silver nitrate after the biopsy was performed.
Lab: 6
Consent: Written consent was obtained and risks were reviewed including but not limited to scarring, infection, bleeding, scabbing, incomplete removal, nerve damage and allergy to anesthesia.
Post-Care Instructions: I reviewed with the patient in detail post-care instructions. Patient is to keep the biopsy site dry overnight, and then apply bacitracin twice daily until healed. Patient may apply hydrogen peroxide soaks to remove any crusting.
Notification Instructions: Patient will be notified of biopsy results. However, patient instructed to call the office if not contacted within 2 weeks.
Billing Type: Third-Party Bill
Information: Selecting Yes will display possible errors in your note based on the variables you have selected. This validation is only offered as a suggestion for you. PLEASE NOTE THAT THE VALIDATION TEXT WILL BE REMOVED WHEN YOU FINALIZE YOUR NOTE. IF YOU WANT TO FAX A PRELIMINARY NOTE YOU WILL NEED TO TOGGLE THIS TO 'NO' IF YOU DO NOT WANT IT IN YOUR FAXED NOTE.

## 2024-07-19 ENCOUNTER — OFFICE (OUTPATIENT)
Dept: URBAN - METROPOLITAN AREA CLINIC 18 | Facility: CLINIC | Age: 64
End: 2024-07-19

## 2024-07-19 VITALS
HEIGHT: 63 IN | WEIGHT: 169 LBS | HEART RATE: 75 BPM | SYSTOLIC BLOOD PRESSURE: 164 MMHG | DIASTOLIC BLOOD PRESSURE: 78 MMHG | OXYGEN SATURATION: 97 %

## 2024-07-19 DIAGNOSIS — K21.9 GASTRO-ESOPHAGEAL REFLUX DISEASE WITHOUT ESOPHAGITIS: ICD-10-CM

## 2024-07-19 PROCEDURE — 99213 OFFICE O/P EST LOW 20 MIN: CPT | Performed by: INTERNAL MEDICINE

## 2024-12-23 ENCOUNTER — AMBULATORY SURGICAL CENTER (OUTPATIENT)
Dept: URBAN - METROPOLITAN AREA SURGERY 7 | Facility: SURGERY | Age: 64
End: 2024-12-23
Payer: COMMERCIAL

## 2024-12-23 ENCOUNTER — OFFICE (OUTPATIENT)
Dept: URBAN - METROPOLITAN AREA PATHOLOGY 1 | Facility: PATHOLOGY | Age: 64
End: 2024-12-23
Payer: COMMERCIAL

## 2024-12-23 VITALS
HEART RATE: 70 BPM | HEART RATE: 91 BPM | RESPIRATION RATE: 26 BRPM | RESPIRATION RATE: 12 BRPM | SYSTOLIC BLOOD PRESSURE: 104 MMHG | SYSTOLIC BLOOD PRESSURE: 154 MMHG | SYSTOLIC BLOOD PRESSURE: 96 MMHG | RESPIRATION RATE: 12 BRPM | RESPIRATION RATE: 16 BRPM | DIASTOLIC BLOOD PRESSURE: 46 MMHG | HEART RATE: 87 BPM | DIASTOLIC BLOOD PRESSURE: 55 MMHG | WEIGHT: 164 LBS | DIASTOLIC BLOOD PRESSURE: 48 MMHG | DIASTOLIC BLOOD PRESSURE: 47 MMHG | DIASTOLIC BLOOD PRESSURE: 50 MMHG | RESPIRATION RATE: 23 BRPM | OXYGEN SATURATION: 100 % | SYSTOLIC BLOOD PRESSURE: 181 MMHG | RESPIRATION RATE: 31 BRPM | SYSTOLIC BLOOD PRESSURE: 150 MMHG | DIASTOLIC BLOOD PRESSURE: 89 MMHG | DIASTOLIC BLOOD PRESSURE: 49 MMHG | TEMPERATURE: 97.7 F | RESPIRATION RATE: 15 BRPM | WEIGHT: 164 LBS | DIASTOLIC BLOOD PRESSURE: 72 MMHG | RESPIRATION RATE: 22 BRPM | DIASTOLIC BLOOD PRESSURE: 48 MMHG | RESPIRATION RATE: 11 BRPM | DIASTOLIC BLOOD PRESSURE: 67 MMHG | SYSTOLIC BLOOD PRESSURE: 93 MMHG | OXYGEN SATURATION: 96 % | HEART RATE: 99 BPM | HEART RATE: 111 BPM | SYSTOLIC BLOOD PRESSURE: 114 MMHG | RESPIRATION RATE: 24 BRPM | OXYGEN SATURATION: 97 % | SYSTOLIC BLOOD PRESSURE: 166 MMHG | SYSTOLIC BLOOD PRESSURE: 104 MMHG | SYSTOLIC BLOOD PRESSURE: 114 MMHG | SYSTOLIC BLOOD PRESSURE: 181 MMHG | DIASTOLIC BLOOD PRESSURE: 55 MMHG | OXYGEN SATURATION: 100 % | HEART RATE: 82 BPM | SYSTOLIC BLOOD PRESSURE: 121 MMHG | SYSTOLIC BLOOD PRESSURE: 89 MMHG | HEART RATE: 81 BPM | DIASTOLIC BLOOD PRESSURE: 96 MMHG | DIASTOLIC BLOOD PRESSURE: 52 MMHG | OXYGEN SATURATION: 95 % | HEART RATE: 66 BPM | SYSTOLIC BLOOD PRESSURE: 89 MMHG | SYSTOLIC BLOOD PRESSURE: 90 MMHG | HEART RATE: 73 BPM | OXYGEN SATURATION: 98 % | SYSTOLIC BLOOD PRESSURE: 158 MMHG | DIASTOLIC BLOOD PRESSURE: 78 MMHG | RESPIRATION RATE: 15 BRPM | HEART RATE: 82 BPM | RESPIRATION RATE: 11 BRPM | SYSTOLIC BLOOD PRESSURE: 93 MMHG | DIASTOLIC BLOOD PRESSURE: 42 MMHG | HEART RATE: 87 BPM | SYSTOLIC BLOOD PRESSURE: 91 MMHG | OXYGEN SATURATION: 96 % | DIASTOLIC BLOOD PRESSURE: 67 MMHG | DIASTOLIC BLOOD PRESSURE: 45 MMHG | HEART RATE: 66 BPM | HEART RATE: 70 BPM | HEART RATE: 84 BPM | DIASTOLIC BLOOD PRESSURE: 42 MMHG | HEART RATE: 74 BPM | HEART RATE: 74 BPM | DIASTOLIC BLOOD PRESSURE: 89 MMHG | SYSTOLIC BLOOD PRESSURE: 154 MMHG | HEART RATE: 72 BPM | OXYGEN SATURATION: 97 % | HEART RATE: 84 BPM | OXYGEN SATURATION: 95 % | DIASTOLIC BLOOD PRESSURE: 65 MMHG | DIASTOLIC BLOOD PRESSURE: 72 MMHG | RESPIRATION RATE: 28 BRPM | OXYGEN SATURATION: 94 % | RESPIRATION RATE: 19 BRPM | HEART RATE: 72 BPM | OXYGEN SATURATION: 92 % | SYSTOLIC BLOOD PRESSURE: 91 MMHG | RESPIRATION RATE: 16 BRPM | HEART RATE: 99 BPM | RESPIRATION RATE: 24 BRPM | RESPIRATION RATE: 26 BRPM | DIASTOLIC BLOOD PRESSURE: 52 MMHG | DIASTOLIC BLOOD PRESSURE: 47 MMHG | DIASTOLIC BLOOD PRESSURE: 45 MMHG | SYSTOLIC BLOOD PRESSURE: 150 MMHG | HEART RATE: 76 BPM | DIASTOLIC BLOOD PRESSURE: 78 MMHG | SYSTOLIC BLOOD PRESSURE: 121 MMHG | HEART RATE: 91 BPM | DIASTOLIC BLOOD PRESSURE: 96 MMHG | OXYGEN SATURATION: 98 % | DIASTOLIC BLOOD PRESSURE: 50 MMHG | HEART RATE: 111 BPM | DIASTOLIC BLOOD PRESSURE: 49 MMHG | OXYGEN SATURATION: 92 % | SYSTOLIC BLOOD PRESSURE: 158 MMHG | DIASTOLIC BLOOD PRESSURE: 65 MMHG | HEART RATE: 83 BPM | HEIGHT: 62 IN | RESPIRATION RATE: 7 BRPM | RESPIRATION RATE: 7 BRPM | SYSTOLIC BLOOD PRESSURE: 166 MMHG | RESPIRATION RATE: 23 BRPM | HEART RATE: 76 BPM | RESPIRATION RATE: 22 BRPM | HEART RATE: 81 BPM | DIASTOLIC BLOOD PRESSURE: 54 MMHG | RESPIRATION RATE: 31 BRPM | HEART RATE: 73 BPM | SYSTOLIC BLOOD PRESSURE: 115 MMHG | TEMPERATURE: 97.7 F | OXYGEN SATURATION: 94 % | SYSTOLIC BLOOD PRESSURE: 90 MMHG | SYSTOLIC BLOOD PRESSURE: 96 MMHG | RESPIRATION RATE: 28 BRPM | SYSTOLIC BLOOD PRESSURE: 115 MMHG | DIASTOLIC BLOOD PRESSURE: 54 MMHG | DIASTOLIC BLOOD PRESSURE: 46 MMHG | RESPIRATION RATE: 19 BRPM | HEART RATE: 83 BPM | HEART RATE: 80 BPM | HEIGHT: 62 IN | HEART RATE: 80 BPM

## 2024-12-23 DIAGNOSIS — D12.2 BENIGN NEOPLASM OF ASCENDING COLON: ICD-10-CM

## 2024-12-23 DIAGNOSIS — K44.9 DIAPHRAGMATIC HERNIA WITHOUT OBSTRUCTION OR GANGRENE: ICD-10-CM

## 2024-12-23 DIAGNOSIS — D12.3 BENIGN NEOPLASM OF TRANSVERSE COLON: ICD-10-CM

## 2024-12-23 DIAGNOSIS — K31.89 OTHER DISEASES OF STOMACH AND DUODENUM: ICD-10-CM

## 2024-12-23 DIAGNOSIS — K29.30 CHRONIC SUPERFICIAL GASTRITIS WITHOUT BLEEDING: ICD-10-CM

## 2024-12-23 DIAGNOSIS — Z86.0100 PERSONAL HISTORY OF COLON POLYPS, UNSPECIFIED: ICD-10-CM

## 2024-12-23 DIAGNOSIS — R13.10 DYSPHAGIA, UNSPECIFIED: ICD-10-CM

## 2024-12-23 DIAGNOSIS — Z09 ENCOUNTER FOR FOLLOW-UP EXAMINATION AFTER COMPLETED TREATMEN: ICD-10-CM

## 2024-12-23 DIAGNOSIS — K21.9 GASTRO-ESOPHAGEAL REFLUX DISEASE WITHOUT ESOPHAGITIS: ICD-10-CM

## 2024-12-23 DIAGNOSIS — K31.7 POLYP OF STOMACH AND DUODENUM: ICD-10-CM

## 2024-12-23 DIAGNOSIS — K63.5 POLYP OF COLON: ICD-10-CM

## 2024-12-23 DIAGNOSIS — K22.89 OTHER SPECIFIED DISEASE OF ESOPHAGUS: ICD-10-CM

## 2024-12-23 DIAGNOSIS — Z93.3 COLOSTOMY STATUS: ICD-10-CM

## 2024-12-23 DIAGNOSIS — D37.1 NEOPLASM OF UNCERTAIN BEHAVIOR OF STOMACH: ICD-10-CM

## 2024-12-23 DIAGNOSIS — Z86.0101 PERSONAL HISTORY OF ADENOMATOUS AND SERRATED COLON POLYPS: ICD-10-CM

## 2024-12-23 PROBLEM — Z86.010 SURVEILLANCE DUE TO PRIOR COLONIC NEOPLASIA: Status: ACTIVE | Noted: 2024-12-23

## 2024-12-23 PROCEDURE — 43239 EGD BIOPSY SINGLE/MULTIPLE: CPT | Mod: 51 | Performed by: INTERNAL MEDICINE

## 2024-12-23 PROCEDURE — 44394 COLONOSCOPY W/SNARE: CPT | Performed by: INTERNAL MEDICINE

## 2024-12-23 PROCEDURE — 88305 TISSUE EXAM BY PATHOLOGIST: CPT | Performed by: PATHOLOGY

## 2024-12-23 PROCEDURE — 88342 IMHCHEM/IMCYTCHM 1ST ANTB: CPT | Performed by: PATHOLOGY

## 2024-12-23 PROCEDURE — 44394 COLONOSCOPY W/SNARE: CPT | Mod: 33 | Performed by: INTERNAL MEDICINE

## 2024-12-31 LAB
PDF: PDF REPORT: (no result)
PDF: PDF REPORT: (no result)

## 2025-01-21 ENCOUNTER — OFFICE (OUTPATIENT)
Dept: URBAN - METROPOLITAN AREA CLINIC 18 | Age: 65
End: 2025-01-21
Payer: MEDICARE

## 2025-01-21 VITALS — DIASTOLIC BLOOD PRESSURE: 70 MMHG | WEIGHT: 170 LBS | SYSTOLIC BLOOD PRESSURE: 138 MMHG | HEIGHT: 62 IN

## 2025-01-21 DIAGNOSIS — E03.9 HYPOTHYROIDISM, UNSPECIFIED: ICD-10-CM

## 2025-01-21 DIAGNOSIS — Z85.72 PERSONAL HISTORY OF NON-HODGKIN LYMPHOMAS: ICD-10-CM

## 2025-01-21 DIAGNOSIS — R10.84 GENERALIZED ABDOMINAL PAIN: ICD-10-CM

## 2025-01-21 DIAGNOSIS — K21.9 GASTRO-ESOPHAGEAL REFLUX DISEASE WITHOUT ESOPHAGITIS: ICD-10-CM

## 2025-01-21 DIAGNOSIS — N18.9 CHRONIC KIDNEY DISEASE, UNSPECIFIED: ICD-10-CM

## 2025-01-21 DIAGNOSIS — K94.03 COLOSTOMY MALFUNCTION: ICD-10-CM

## 2025-01-21 DIAGNOSIS — Z85.048 PERSONAL HISTORY OF OTHER MALIGNANT NEOPLASM OF RECTUM, RECT: ICD-10-CM

## 2025-01-21 DIAGNOSIS — Z86.0101 PERSONAL HISTORY OF ADENOMATOUS AND SERRATED COLON POLYPS: ICD-10-CM

## 2025-01-21 DIAGNOSIS — D50.9 IRON DEFICIENCY ANEMIA, UNSPECIFIED: ICD-10-CM

## 2025-01-21 DIAGNOSIS — Z87.19 PERSONAL HISTORY OF OTHER DISEASES OF THE DIGESTIVE SYSTEM: ICD-10-CM

## 2025-01-21 LAB
CBC, PLATELET CT  AND  DIFF: ABS BASOPHIL: 0.1 K/UL
CBC, PLATELET CT  AND  DIFF: ABS EOSINOPHIL: 0.4 K/UL
CBC, PLATELET CT  AND  DIFF: ABS IMMATURE GRANS: 0 K/UL
CBC, PLATELET CT  AND  DIFF: ABS LYMPHOCYTE: 2.5 K/UL
CBC, PLATELET CT  AND  DIFF: ABS MONOCYTE: 1 K/UL
CBC, PLATELET CT  AND  DIFF: ABS NEUTROPHIL: 7.4 K/UL
CBC, PLATELET CT  AND  DIFF: BASOPHIL: 0.8 %
CBC, PLATELET CT  AND  DIFF: DIFFERENTIAL: (no result)
CBC, PLATELET CT  AND  DIFF: EOSINOPHIL: 3.2 %
CBC, PLATELET CT  AND  DIFF: HEMATOCRIT: 41.4 %
CBC, PLATELET CT  AND  DIFF: HEMOGLOBIN: 13.2 G/DL
CBC, PLATELET CT  AND  DIFF: IMMATURE GRANULOCYTES: 0.4 %
CBC, PLATELET CT  AND  DIFF: LYMPHOCYTE: 21.8 %
CBC, PLATELET CT  AND  DIFF: MCH: 29.4 PG
CBC, PLATELET CT  AND  DIFF: MCHC: 31.9 G/DL
CBC, PLATELET CT  AND  DIFF: MCV: 92.2 FL
CBC, PLATELET CT  AND  DIFF: MONOCYTE: 8.4 %
CBC, PLATELET CT  AND  DIFF: MPV: 11.2 FL
CBC, PLATELET CT  AND  DIFF: NEUTROPHIL: 65.4 %
CBC, PLATELET CT  AND  DIFF: NRBCS: 0 /100 WBC
CBC, PLATELET CT  AND  DIFF: PLATELET COUNT: 396 K/UL
CBC, PLATELET CT  AND  DIFF: RBC: 4.49 M/UL
CBC, PLATELET CT  AND  DIFF: RDW: 13.9 %
CBC, PLATELET CT  AND  DIFF: WBC COUNT: 11.4 K/UL — HIGH
COMPREHENSIVE METABOLIC PANEL: A/G RATIO: 1.2 RATIO
COMPREHENSIVE METABOLIC PANEL: ALBUMIN: 4.3 G/DL
COMPREHENSIVE METABOLIC PANEL: ALK PHOSPHATASE: 116 U/L
COMPREHENSIVE METABOLIC PANEL: ALT: 22 U/L
COMPREHENSIVE METABOLIC PANEL: AST: 27 U/L
COMPREHENSIVE METABOLIC PANEL: BILIRUBIN,TOTAL: 0.3 MG/DL
COMPREHENSIVE METABOLIC PANEL: BLOOD UREA NITROGEN: 30 MG/DL — HIGH
COMPREHENSIVE METABOLIC PANEL: BUN/CREAT RATIO: 20
COMPREHENSIVE METABOLIC PANEL: CALCIUM: 10.1 MG/DL
COMPREHENSIVE METABOLIC PANEL: CHLORIDE: 105 MEQ/L
COMPREHENSIVE METABOLIC PANEL: CO2: 23 MEQ/L
COMPREHENSIVE METABOLIC PANEL: CREATININE: 1.5 MG/DL — HIGH
COMPREHENSIVE METABOLIC PANEL: FASTING STATUS: (no result)
COMPREHENSIVE METABOLIC PANEL: GLOBULIN: 3.7 G/DL — HIGH
COMPREHENSIVE METABOLIC PANEL: GLOMERULAR FILTRATION RATE (GFR): 38 MLS/MIN/1.73M2 — LOW
COMPREHENSIVE METABOLIC PANEL: GLUCOSE,RANDOM: 102 MG/DL — HIGH
COMPREHENSIVE METABOLIC PANEL: POTASSIUM: 5 MEQ/L
COMPREHENSIVE METABOLIC PANEL: SODIUM: 142 MEQ/L
COMPREHENSIVE METABOLIC PANEL: TOTAL PROTEIN: 8 G/DL
FREE T3: 2.3 PG/ML
Lab: 12.9 MCG/DL
TSH: 2.3 MCIU/ML

## 2025-01-21 PROCEDURE — 99214 OFFICE O/P EST MOD 30 MIN: CPT | Performed by: PHYSICIAN ASSISTANT
